# Patient Record
Sex: MALE | Race: WHITE | Employment: FULL TIME | URBAN - METROPOLITAN AREA
[De-identification: names, ages, dates, MRNs, and addresses within clinical notes are randomized per-mention and may not be internally consistent; named-entity substitution may affect disease eponyms.]

---

## 2019-04-12 NOTE — PROGRESS NOTES
[facility] / [facility] ([facility]) Génesis Esquivel, [address]    Acknowledgment of Informed Consent for Surgical or Medical Procedure and Sedation  I agree to allow doctor(s) Hari Banegas and his/her associates or assistants, including residents and/or other qualified medical practitioner to perform the following medical treatment or procedure and to administer or direct the administration of sedation as necessary:  Procedure(s): RIGHT EYE INFERIOR OBLIQUE WEAKENING BY RECESSION  My doctor has explained the following regarding the proposed procedure:   the explanation of the procedure   the benefits of the procedure   the potential problems that might occur during recuperation   the risks and side effects of the procedure which could include but are not limited to severe blood loss, infection, stroke or death   the benefits, risks and side effect of alternative procedures including the consequences of declining this procedure or any alternative procedures   the likelihood of achieving satisfactory results. I acknowledge no guarantee or assurance has been made to me regarding the results. I understand that during the course of this treatment/procedure, unforeseen conditions can occur which require an additional or different procedure. I agree to allow my physician or assistants to perform such extension of the original procedure as they may find necessary. I understand that sedation will often result in temporary impairment of memory and fine motor skills and that sedation can occasionally progress to a state of deep sedation or general anesthesia. I understand the risks of anesthesia for surgery include, but are not limited to, sore throat, hoarseness, injury to face, mouth, or teeth; nausea; headache; injury to blood vessels or nerves; death, brain damage, or paralysis.     I understand that if I have a Limitation of Treatment order in effect during my hospitalization, the order may or may not be in effect during this procedure. I give my doctor permission to give me blood or blood products. I understand that there are risks with receiving blood such as hepatitis, AIDS, fever, or allergic reaction. I acknowledge that the risks, benefits, and alternatives of this treatment have been explained to me and that no express or implied warranty has been given by the hospital, any blood bank, or any person or entity as to the blood or blood components transfused. At the discretion of my doctor, I agree to allow observers, equipment/product representatives and allow photographing, and/or televising of the procedure, provided my name or identity is maintained confidentially. I agree the hospital may dispose of or use for scientific or educational purposes any tissue, fluid, or body parts which may be removed.     ________________________________Date________Time______ am/pm  ([place] [facility])  Patient or Signature of Closest Relative or Legal Guardian    ________________________________Date________Time______am/pm      Page 1 of  1  Witness

## 2019-04-18 NOTE — PROGRESS NOTES
PRE-OP INSTRUCTIONS FOR THE SURGICAL PATIENT YOU ARE UNABLE TO MAKE CONTACT FOR AN INTERVIEW:      1. Follow instructions for your ARRIVAL TIME as DIRECTED BY YOUR SURGEON. 2. Enter the MAIN entrance located on [address] and report to the desk. 3. Bring your insurance & prescription card and photo ID with you. You may also be asked to pay a co-pay, as you may want to bring a check or credit card with you. 4. Leave all other valuables at home. 5. Arrange for someone to drive you home and be with you for the first 24 hours after discharge. 6. You must contact your surgeon for ALL medication instructions, especially if taking blood thinners, aspirin, or diabetic medication. 7. A Pre-op History and Physical for surgery MUST be completed by your Physician or an Urgent Care within 30 days of your procedure date. Please bring a copy with you on the day of your procedure and along with any other testing performed. 8. DO NOT EAT ANYTHING eight hours prior to surgery. May have up to 8 ounces of water 4 hours prior to surgery. 9. No gum, candy, mints, or ice chips day of procedure. 10. Please refrain from drinking alcohol the day before or day of your procedure. 11. Please do not smoke the day of your procedure. 12. Dress in loose, comfortable clothing appropriate for redressing after your procedure. Do not wear jewelry (including body piercings), make-up, fingernail polish, lotion, powders or metal hairclips. 15. Contacts will need to be removed prior to surgery. You may want to bring your            Eye glasses to wear immediately before and after surgery. 14. Dentures will need to be removed before your procedure. 13. Bring cases for your glasses, contacts, dentures, or hearing aids to protect them while you are in surgery. 16. If you use a CPAP, please bring it with you on the day of your procedure.   17. Do not shave or wax for 72 hours prior to procedure near your operative site  18. FOR WOMAN OF CHILDBEARING AGE ONLY- please bring a urine sample with you on day of surgery or make sure we can collect on arrival.    If you have further questions, you may contact us at [phone]    Left instructions on patient's voicemail. Haroldo Nair. 4/18/2019 .2:02 PM

## 2019-04-22 ENCOUNTER — ANESTHESIA EVENT (OUTPATIENT)
Dept: OPERATING ROOM | Age: 49
End: 2019-04-22
Payer: COMMERCIAL

## 2019-04-23 ENCOUNTER — ANESTHESIA (OUTPATIENT)
Dept: OPERATING ROOM | Age: 49
End: 2019-04-23
Payer: COMMERCIAL

## 2019-04-23 ENCOUNTER — HOSPITAL ENCOUNTER (OUTPATIENT)
Age: 49
Setting detail: OUTPATIENT SURGERY
Discharge: HOME | End: 2019-04-23
Attending: OPHTHALMOLOGY | Admitting: OPHTHALMOLOGY
Payer: COMMERCIAL

## 2019-04-23 VITALS — SYSTOLIC BLOOD PRESSURE: 169 MMHG | DIASTOLIC BLOOD PRESSURE: 99 MMHG | OXYGEN SATURATION: 98 % | TEMPERATURE: 97.2 F

## 2019-04-23 VITALS
HEART RATE: 77 BPM | OXYGEN SATURATION: 94 % | WEIGHT: 315 LBS | SYSTOLIC BLOOD PRESSURE: 99 MMHG | BODY MASS INDEX: 46.65 KG/M2 | HEIGHT: 69 IN | TEMPERATURE: 97.3 F | DIASTOLIC BLOOD PRESSURE: 64 MMHG | RESPIRATION RATE: 16 BRPM

## 2019-04-23 LAB
GLUCOSE BLD-MCNC: 144 MG/DL (ref 70–99)
GLUCOSE BLD-MCNC: 168 MG/DL (ref 70–99)
PERFORMED ON: ABNORMAL
PERFORMED ON: ABNORMAL

## 2019-04-23 PROCEDURE — 7100000010 HC PHASE II RECOVERY - FIRST 15 MIN: Performed by: OPHTHALMOLOGY

## 2019-04-23 PROCEDURE — 3700000001 HC ADD 15 MINUTES (ANESTHESIA): Performed by: OPHTHALMOLOGY

## 2019-04-23 PROCEDURE — 2580000003 HC RX 258: Performed by: OPHTHALMOLOGY

## 2019-04-23 PROCEDURE — 7100000000 HC PACU RECOVERY - FIRST 15 MIN: Performed by: OPHTHALMOLOGY

## 2019-04-23 PROCEDURE — 2709999900 HC NON-CHARGEABLE SUPPLY: Performed by: OPHTHALMOLOGY

## 2019-04-23 PROCEDURE — 3600000003 HC SURGERY LEVEL 3 BASE: Performed by: OPHTHALMOLOGY

## 2019-04-23 PROCEDURE — 6360000002 HC RX W HCPCS: Performed by: NURSE ANESTHETIST, CERTIFIED REGISTERED

## 2019-04-23 PROCEDURE — 2580000003 HC RX 258: Performed by: ANESTHESIOLOGY

## 2019-04-23 PROCEDURE — 7100000011 HC PHASE II RECOVERY - ADDTL 15 MIN: Performed by: OPHTHALMOLOGY

## 2019-04-23 PROCEDURE — 7100000001 HC PACU RECOVERY - ADDTL 15 MIN: Performed by: OPHTHALMOLOGY

## 2019-04-23 PROCEDURE — 6360000002 HC RX W HCPCS: Performed by: ANESTHESIOLOGY

## 2019-04-23 PROCEDURE — 2500000003 HC RX 250 WO HCPCS: Performed by: NURSE ANESTHETIST, CERTIFIED REGISTERED

## 2019-04-23 PROCEDURE — 3600000013 HC SURGERY LEVEL 3 ADDTL 15MIN: Performed by: OPHTHALMOLOGY

## 2019-04-23 PROCEDURE — 3700000000 HC ANESTHESIA ATTENDED CARE: Performed by: OPHTHALMOLOGY

## 2019-04-23 PROCEDURE — 6370000000 HC RX 637 (ALT 250 FOR IP): Performed by: OPHTHALMOLOGY

## 2019-04-23 RX ORDER — ONDANSETRON 2 MG/ML
INJECTION INTRAMUSCULAR; INTRAVENOUS PRN
Status: DISCONTINUED | OUTPATIENT
Start: 2019-04-23 | End: 2019-04-23 | Stop reason: SDUPTHER

## 2019-04-23 RX ORDER — TETRACAINE HYDROCHLORIDE 5 MG/ML
SOLUTION OPHTHALMIC PRN
Status: DISCONTINUED | OUTPATIENT
Start: 2019-04-23 | End: 2019-04-23 | Stop reason: ALTCHOICE

## 2019-04-23 RX ORDER — TOBRAMYCIN AND DEXAMETHASONE 3; 1 MG/ML; MG/ML
SUSPENSION/ DROPS OPHTHALMIC PRN
Status: DISCONTINUED | OUTPATIENT
Start: 2019-04-23 | End: 2019-04-23 | Stop reason: ALTCHOICE

## 2019-04-23 RX ORDER — ROCURONIUM BROMIDE 10 MG/ML
INJECTION, SOLUTION INTRAVENOUS PRN
Status: DISCONTINUED | OUTPATIENT
Start: 2019-04-23 | End: 2019-04-23 | Stop reason: SDUPTHER

## 2019-04-23 RX ORDER — SUCCINYLCHOLINE/SOD CL,ISO/PF 200MG/10ML
SYRINGE (ML) INTRAVENOUS PRN
Status: DISCONTINUED | OUTPATIENT
Start: 2019-04-23 | End: 2019-04-23 | Stop reason: SDUPTHER

## 2019-04-23 RX ORDER — PROPOFOL 10 MG/ML
INJECTION, EMULSION INTRAVENOUS PRN
Status: DISCONTINUED | OUTPATIENT
Start: 2019-04-23 | End: 2019-04-23 | Stop reason: SDUPTHER

## 2019-04-23 RX ORDER — PHENYLEPHRINE HCL 2.5 %
DROPS OPHTHALMIC (EYE) PRN
Status: DISCONTINUED | OUTPATIENT
Start: 2019-04-23 | End: 2019-04-23 | Stop reason: ALTCHOICE

## 2019-04-23 RX ORDER — PROMETHAZINE HYDROCHLORIDE 25 MG/ML
6.25 INJECTION, SOLUTION INTRAMUSCULAR; INTRAVENOUS
Status: DISCONTINUED | OUTPATIENT
Start: 2019-04-23 | End: 2019-04-23 | Stop reason: HOSPADM

## 2019-04-23 RX ORDER — FENTANYL CITRATE 50 UG/ML
50 INJECTION, SOLUTION INTRAMUSCULAR; INTRAVENOUS EVERY 5 MIN PRN
Status: DISCONTINUED | OUTPATIENT
Start: 2019-04-23 | End: 2019-04-23 | Stop reason: HOSPADM

## 2019-04-23 RX ORDER — LIDOCAINE HYDROCHLORIDE 20 MG/ML
INJECTION, SOLUTION INTRAVENOUS PRN
Status: DISCONTINUED | OUTPATIENT
Start: 2019-04-23 | End: 2019-04-23 | Stop reason: SDUPTHER

## 2019-04-23 RX ORDER — FENTANYL CITRATE 50 UG/ML
INJECTION, SOLUTION INTRAMUSCULAR; INTRAVENOUS PRN
Status: DISCONTINUED | OUTPATIENT
Start: 2019-04-23 | End: 2019-04-23 | Stop reason: SDUPTHER

## 2019-04-23 RX ORDER — SODIUM CHLORIDE, SODIUM LACTATE, POTASSIUM CHLORIDE, CALCIUM CHLORIDE 600; 310; 30; 20 MG/100ML; MG/100ML; MG/100ML; MG/100ML
INJECTION, SOLUTION INTRAVENOUS CONTINUOUS
Status: DISCONTINUED | OUTPATIENT
Start: 2019-04-23 | End: 2019-04-23 | Stop reason: HOSPADM

## 2019-04-23 RX ORDER — MAGNESIUM HYDROXIDE 1200 MG/15ML
LIQUID ORAL PRN
Status: DISCONTINUED | OUTPATIENT
Start: 2019-04-23 | End: 2019-04-23 | Stop reason: ALTCHOICE

## 2019-04-23 RX ORDER — BALANCED SALT SOLUTION 6.4; .75; .48; .3; 3.9; 1.7 MG/ML; MG/ML; MG/ML; MG/ML; MG/ML; MG/ML
SOLUTION OPHTHALMIC PRN
Status: DISCONTINUED | OUTPATIENT
Start: 2019-04-23 | End: 2019-04-23 | Stop reason: ALTCHOICE

## 2019-04-23 RX ORDER — HYDROCHLOROTHIAZIDE 25 MG/1
25 TABLET ORAL DAILY
COMMUNITY

## 2019-04-23 RX ORDER — ONDANSETRON 2 MG/ML
4 INJECTION INTRAMUSCULAR; INTRAVENOUS
Status: DISCONTINUED | OUTPATIENT
Start: 2019-04-23 | End: 2019-04-23 | Stop reason: HOSPADM

## 2019-04-23 RX ORDER — DEXAMETHASONE SODIUM PHOSPHATE 4 MG/ML
INJECTION, SOLUTION INTRA-ARTICULAR; INTRALESIONAL; INTRAMUSCULAR; INTRAVENOUS; SOFT TISSUE PRN
Status: DISCONTINUED | OUTPATIENT
Start: 2019-04-23 | End: 2019-04-23 | Stop reason: SDUPTHER

## 2019-04-23 RX ORDER — FENTANYL CITRATE 50 UG/ML
25 INJECTION, SOLUTION INTRAMUSCULAR; INTRAVENOUS EVERY 5 MIN PRN
Status: DISCONTINUED | OUTPATIENT
Start: 2019-04-23 | End: 2019-04-23 | Stop reason: HOSPADM

## 2019-04-23 RX ORDER — GLYCOPYRROLATE 1 MG/5 ML
SYRINGE (ML) INTRAVENOUS PRN
Status: DISCONTINUED | OUTPATIENT
Start: 2019-04-23 | End: 2019-04-23 | Stop reason: SDUPTHER

## 2019-04-23 RX ORDER — BENAZEPRIL HYDROCHLORIDE 20 MG/1
20 TABLET ORAL DAILY
COMMUNITY

## 2019-04-23 RX ORDER — LEVOTHYROXINE SODIUM 0.07 MG/1
75 TABLET ORAL DAILY
COMMUNITY

## 2019-04-23 RX ORDER — MIDAZOLAM HYDROCHLORIDE 1 MG/ML
INJECTION INTRAMUSCULAR; INTRAVENOUS PRN
Status: DISCONTINUED | OUTPATIENT
Start: 2019-04-23 | End: 2019-04-23 | Stop reason: SDUPTHER

## 2019-04-23 RX ORDER — AMLODIPINE BESYLATE 5 MG/1
5 TABLET ORAL DAILY
COMMUNITY

## 2019-04-23 RX ORDER — PRAVASTATIN SODIUM 40 MG
40 TABLET ORAL DAILY
COMMUNITY

## 2019-04-23 RX ADMIN — MIDAZOLAM HYDROCHLORIDE 1 MG: 2 INJECTION, SOLUTION INTRAMUSCULAR; INTRAVENOUS at 09:06

## 2019-04-23 RX ADMIN — FENTANYL CITRATE 50 MCG: 50 INJECTION INTRAMUSCULAR; INTRAVENOUS at 10:42

## 2019-04-23 RX ADMIN — FENTANYL CITRATE 50 MCG: 50 INJECTION INTRAMUSCULAR; INTRAVENOUS at 09:20

## 2019-04-23 RX ADMIN — PROPOFOL 250 MG: 10 INJECTION, EMULSION INTRAVENOUS at 09:20

## 2019-04-23 RX ADMIN — SUGAMMADEX 200 MG: 100 INJECTION, SOLUTION INTRAVENOUS at 09:50

## 2019-04-23 RX ADMIN — Medication 0.2 MG: at 09:06

## 2019-04-23 RX ADMIN — LIDOCAINE HYDROCHLORIDE 100 MG: 20 INJECTION, SOLUTION INTRAVENOUS at 09:20

## 2019-04-23 RX ADMIN — SODIUM CHLORIDE, POTASSIUM CHLORIDE, SODIUM LACTATE AND CALCIUM CHLORIDE: 600; 310; 30; 20 INJECTION, SOLUTION INTRAVENOUS at 08:00

## 2019-04-23 RX ADMIN — ROCURONIUM BROMIDE 5 MG: 10 INJECTION, SOLUTION INTRAVENOUS at 09:20

## 2019-04-23 RX ADMIN — ONDANSETRON 4 MG: 2 INJECTION INTRAMUSCULAR; INTRAVENOUS at 09:36

## 2019-04-23 RX ADMIN — ROCURONIUM BROMIDE 20 MG: 10 INJECTION, SOLUTION INTRAVENOUS at 09:40

## 2019-04-23 RX ADMIN — DEXAMETHASONE SODIUM PHOSPHATE 8 MG: 4 INJECTION, SOLUTION INTRAMUSCULAR; INTRAVENOUS at 09:36

## 2019-04-23 RX ADMIN — FENTANYL CITRATE 25 MCG: 50 INJECTION INTRAMUSCULAR; INTRAVENOUS at 10:59

## 2019-04-23 RX ADMIN — Medication 140 MG: at 09:20

## 2019-04-23 RX ADMIN — ROCURONIUM BROMIDE 25 MG: 10 INJECTION, SOLUTION INTRAVENOUS at 09:22

## 2019-04-23 ASSESSMENT — PULMONARY FUNCTION TESTS
PIF_VALUE: 26
PIF_VALUE: 26
PIF_VALUE: 2
PIF_VALUE: 43
PIF_VALUE: 0
PIF_VALUE: 27
PIF_VALUE: 27
PIF_VALUE: 1
PIF_VALUE: 21
PIF_VALUE: 26
PIF_VALUE: 26
PIF_VALUE: 27
PIF_VALUE: 12
PIF_VALUE: 26
PIF_VALUE: 27
PIF_VALUE: 1
PIF_VALUE: 26
PIF_VALUE: 21
PIF_VALUE: 26
PIF_VALUE: 27
PIF_VALUE: 11
PIF_VALUE: 2
PIF_VALUE: 19
PIF_VALUE: 1
PIF_VALUE: 27
PIF_VALUE: 31
PIF_VALUE: 27
PIF_VALUE: 0
PIF_VALUE: 1
PIF_VALUE: 27
PIF_VALUE: 26
PIF_VALUE: 1
PIF_VALUE: 1
PIF_VALUE: 26
PIF_VALUE: 27
PIF_VALUE: 20
PIF_VALUE: 17
PIF_VALUE: 26
PIF_VALUE: 1
PIF_VALUE: 26
PIF_VALUE: 27

## 2019-04-23 ASSESSMENT — PAIN SCALES - GENERAL
PAINLEVEL_OUTOF10: 8
PAINLEVEL_OUTOF10: 6
PAINLEVEL_OUTOF10: 4
PAINLEVEL_OUTOF10: 0

## 2019-04-23 ASSESSMENT — PAIN DESCRIPTION - PROGRESSION: CLINICAL_PROGRESSION: GRADUALLY IMPROVING

## 2019-04-23 ASSESSMENT — PAIN - FUNCTIONAL ASSESSMENT: PAIN_FUNCTIONAL_ASSESSMENT: 0-10

## 2019-04-23 ASSESSMENT — LIFESTYLE VARIABLES: SMOKING_STATUS: 0

## 2019-04-23 ASSESSMENT — PAIN DESCRIPTION - LOCATION: LOCATION: EYE

## 2019-04-23 ASSESSMENT — PAIN DESCRIPTION - FREQUENCY: FREQUENCY: CONTINUOUS

## 2019-04-23 ASSESSMENT — PAIN DESCRIPTION - PAIN TYPE: TYPE: SURGICAL PAIN

## 2019-04-23 ASSESSMENT — PAIN DESCRIPTION - DESCRIPTORS: DESCRIPTORS: DULL;ACHING

## 2019-04-23 ASSESSMENT — PAIN DESCRIPTION - ORIENTATION: ORIENTATION: RIGHT

## 2019-04-23 NOTE — OP NOTE
[address], [address]                                OPERATIVE REPORT    PATIENT NAME: Shalonda Leary                  :        1970  MED REC NO:   [id number]                          ROOM:  ACCOUNT NO:   [de-identified]                           ADMIT DATE: 2019  PROVIDER:     Larue Osler, MD    DATE OF PROCEDURE:  2019    PREOPERATIVE DIAGNOSIS:  Right hypertropia. POSTOPERATIVE DIAGNOSIS:  Right hypertropia. PROCEDURE:  Right inferior oblique disinsertion. SURGEON:  Larue Osler, M.D. ANESTHESIA:  General using intubation. INDICATIONS FOR PROCEDURE:  The patient is a man, who presented to me  earlier this year with at least a five-plus-year history of double  vision, worse in left gaze, having difficulty with driving and reading,  often finding himself closing his left eye. He also remembers having a  picture in  at his wedding where his left upper lid was down. He  sometimes is aware that he has a left head tilt. He had an MRI done in  2018 because of his double vision by his primary care doctor and it  was normal.  He denies any motor vehicle accidents or any head trauma. He had uncorrected vision of 20/20 in his right eye and 20/25 in his  left. He had a 25 to 30 presumed diopter left hypotropia (right  hypertropia). When I went back and rechecked it later on his first  exam, I could only get 18. The measurements were incomitant. It was  worse in left gaze, less in right gaze, less in downgaze, and about the  same in upgaze. It was much worse on right head tilt and left head  tilt. He had excellent stereopsis, although with [place] 4-dot. He was binocular with both eyes, not using. He did have a small left  head tilt and a small face turn position.   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 I discussed this with him. I told  him I needed more measurements. I did do a 30-minute patch test to make  sure I was able to get his maximum deviation. So, he returned on  02/20/2019. I did the 30-minute patch test.  The maximum I could get  was between 20 and 25 prism diopters of right hypertropia. Again, it  was incomitant as before. His extraocular movements suggested an  overreaction of the right inferior oblique and under-reaction of the  right superior oblique, but in far downgaze, he actually had a slight  overreaction of the left superior oblique, although his clinical  examination did not appear if this was a bilateral superior oblique  palsy. So today's procedure is an attempt to eliminate his right  hypertropia. I told him that his measurements being variable at 18,  definitely would only be doing his right inferior oblique at 30. I will  be doing his right inferior oblique and his left inferior rectus, but  because he is so much better in downgaze and he has excellent fusion, I  told him that I would much rather undercorrect him a little bit and see  if he had anymore symptoms, then overcorrect him and give him a left  hypertropia. So he understood why I was cautious. So, today's  procedure is an attempt to relax the right inferior oblique and  hopefully balance it with the right superior oblique palsy. DESCRIPTION OF PROCEDURE:  The patient was brought to the operating room  where general anesthesia was intubated. The patient was prepped and  draped in the usual sterile manner under anesthesia. There was just a  very small amount of probably slight exotropia if anything, just a tiny  right hyper, but essentially looking pretty straight. The patient was  prepped and draped in the usual sterile manner, and attention directed  to the right eye where speculum was placed to obtain good exposure. I  retracted the eye into the superior nasal quadrant. I did an inferior  temporal cul-de-sac incision. I opened the Tenon's. I secured the  inferior rectus muscle, then the lateral rectus muscle, and used these  muscles as retractors to pull the eye superiorly and superonasally. I  then used the [facility] retractor to visualize the inferior temporal  cul-de-sac where the inferior oblique was visualized. I secured it in a  series of muscle hooks. I did my triple hooking maneuver to make sure I  had it all and I did. I then disinserted the muscle. I encouraged it  to pull into the Tenon's capsule area. I then closed the cul-de-sac incision with two interrupted 6-0 plain  sutures. I put tetracaine eyedrops in his eye. I put Betadine eyedrops  in his eye. I put TobraDex ophthalmic drops in his eye, and then I put  TobraDex ophthalmic ointment in his eye, and then I pressure patched it. The patient was allowed to recover from anesthesia. He was obese and he  had his CPAP machine, which allowed him to recover much better than it  had been before. The patient was taken to the recovery room in a good  condition. DISPOSITION:  I spoke to his wife postoperatively, reiterating the  surgical event and anticipated postop outcome. I reviewed with her his  reason that he was here and what procedure I did today and what possible  procedure I might need to do in the future. I reviewed with her my  four-page postoperative handout, which includes all my instructions for  postoperative care including phone numbers to reach me if there are any  problems. I gave her the TobraDex ophthalmic drops and TobraDex  ophthalmic ointment to use it a total of four times a day into the right  eye for one week. I did not give him any prescriptions, just gave him  medications. I have asked her to have him call me in a couple of days  to let me how he is doing postoperatively.   Assuming things are going  well, he has a tentatively scheduled postoperative visit with me on  05/14/2019.         Melquiades White MD    D: 04/23/2019 12:29:00       T: 04/23/2019 14:00:30     [email or web address]  Job#: [id number]     Doc#: [id number]    CC:  Hal Brush MD

## 2019-04-23 NOTE — FLOWSHEET NOTE
04/23/19 0920   Encounter Summary   Services provided to: Patient and family together   Referral/Consult From: [address] [facility] Family members   Continue Visiting   (4/23, joey )   Complexity of Encounter Moderate   Length of Encounter 15 minutes   Routine   Type Pre-procedure   Assessment Calm; Approachable; Hopeful   Intervention Active listening;Explored feelings, thoughts, concerns;Nurtured hope   Outcome Comfort;Expressed gratitude

## 2019-04-23 NOTE — ANESTHESIA POSTPROCEDURE EVALUATION
Department of Anesthesiology  Postprocedure Note    Patient: Ford Shipman  MRN: [id number]  YOB: 1970  Date of evaluation: 4/23/2019  Time:  12:55 PM     Procedure Summary     Date:  04/23/19 Room / Location:  [facility] OR 13 / [facility] OR    Anesthesia Start:  0915 Anesthesia Stop:  1011    Procedure:  RIGHT EYE INFERIOR OBLIQUE WEAKENING BY RECESSION (Right ) Diagnosis:  (VERTICAL, STRABISMUS RIGHT EYE)    Surgeon:  Madie Benitez MD Responsible Provider:  Marcus Ga MD    Anesthesia Type:  general ASA Status:  3          Anesthesia Type: general    Genaro Phase I: Genaro Score: 10    Genaro Phase II: Genaro Score: 10    Last vitals: Reviewed and per EMR flowsheets.        Anesthesia Post Evaluation    Patient location during evaluation: PACU  Patient participation: complete - patient participated  Level of consciousness: awake and alert  Airway patency: patent  Nausea & Vomiting: no nausea and no vomiting  Complications: no  Cardiovascular status: blood pressure returned to baseline  Respiratory status: acceptable  Hydration status: stable

## 2019-04-23 NOTE — PROGRESS NOTES
Patient admitted to PACU # 5 from OR at 1005 post right eye surgery per Dr. Barbara Hayes. Attached to PACU monitoring system and report received from CRNA. Patient was hemodynamically stable during surgical procedure. Arrived in PACU wakeful and with no initial c/o pain.

## 2019-04-23 NOTE — PROGRESS NOTES
Ambulatory Surgery/Procedure Discharge Note    Vitals:    04/23/19 1147   BP: 99/64   Pulse: 77   Resp: 16   Temp: 97.3 °F (36.3 °C)   SpO2: 94%       In: 805 [I.V.:805]  Out: 0     Restroom use offered before discharge. Yes    Pain assessment:  level of pain (1-10, 10 severe), eye patch changed DR Ceasar Del Rio here  Pain Level: 4        Patient discharged to home/self care.  Patient discharged via wheel chair by transporter to waiting family/S.O.       4/23/2019 12:02 PM

## 2019-04-23 NOTE — H&P
Jemima Ballard    [id number]    [facility] Same Day Surgery Update H & P  Department of General Surgery   Surgical Service   Pre-operative History and Physical  Last H & P within the last 30 days. DIAGNOSIS:   VERTICAL, STRABISMUS RIGHT EYE    PROCEDURE:  IL OFFICE/OUTPT VISIT,PROCEDURE ONLY [36731] (EYE MUSCLE REPAIR)     HISTORY OF PRESENT ILLNESS:   Pleasant, morbidly obese (BMI 5299), 50year old male with vertical strabismus of the right eye presents today for the above procedure.      Past Medical History:        Diagnosis Date    Diabetes mellitus ([address]     TYPE 2    Hyperlipidemia     Hypertension     KAYLAN (obstructive sleep apnea)      Past Surgical History:        Procedure Laterality Date    CHOLECYSTECTOMY       Past Social History:  Social History     Socioeconomic History    Marital status:      Spouse name: Not on file    Number of children: Not on file    Years of education: Not on file    Highest education level: Not on file   Occupational History    Not on file   Social Needs    Financial resource strain: Not on file    Food insecurity:     Worry: Not on file     Inability: Not on file    Transportation needs:     Medical: Not on file     Non-medical: Not on file   Tobacco Use    Smoking status: Not on file   Substance and Sexual Activity    Alcohol use: Not on file    Drug use: Not on file    Sexual activity: Not on file   Lifestyle    Physical activity:     Days per week: Not on file     Minutes per session: Not on file    Stress: Not on file   Relationships    Social connections:     Talks on phone: Not on file     Gets together: Not on file     Attends [employer] service: Not on file     Active member of club or organization: Not on file     Attends meetings of clubs or organizations: Not on file     Relationship status: Not on file    Intimate partner violence:     Fear of current or ex partner: Not on file     Emotionally abused: Not on file     Physically abused: Not on file     Forced sexual activity: Not on file   Other Topics Concern    Not on file   Social History Narrative    Not on file         Medications Prior to Admission:       benazepril (LOTENSIN) 20 MG TABS TAKE 1 TABLET BY MOUTH DAILY    INVOKANA 300 MG TABS TAKE ONE TABLET BY MOUTH EVERY MORNING    metFORMIN (GLUCOPHAGE) 500 MG TABS TAKE TWO TABLETS BY MOUTH TWO TIMES A DAY    levothyroxine (SYNTHROID, LEVOTHROID) 75 MCG TABS TAKE 1 TABLET BY MOUTH DAILY.  hydrochlorothiazide (HYDRODIURIL) 25 MG TABS TAKE 1 TABLET BY MOUTH EVERY MORNING    amlodipine (NORVASC) 5 MG TABS TAKE ONE TABLET BY MOUTH EVERY DAY    pravastatin (PRAVACHOL) 40 MG TABS TAKE ONE TABLET BY MOUTH EVERY DAY       Allergies: Bee Stings [Bee Sting] Hives       PHYSICAL EXAM:      BP (!) 140/89   Pulse 96   Temp 97.4 °F (36.3 °C) (Oral)   Resp 16   Ht 5' 9\" (1.753 m)   Wt (!) 325 lb (147.4 kg)   SpO2 93%   BMI 47.99 kg/m²      Heart:  regular rate and rhythm    Lungs:  No increased work of breathing, good air exchange, clear to auscultation bilaterally, no crackles or wheezing    Abdomen:  soft, non-distended, non-tender, no rebound tenderness or guarding, normal active bowel sounds and no masses palpated    ASSESSMENT AND PLAN:    1. Patient seen and focused exam done today- no new changes since last physical exam on 4/11/19    2. Access to ancillary services are available per request of the provider.     RHEA Villalobos - CNP     4/23/2019

## 2019-04-23 NOTE — ANESTHESIA PRE PROCEDURE
Department of Anesthesiology  Preprocedure Note       Name:  Juliane Merritt   Age:  50 y.o.  :  1970                                          MRN:  [id number]         Date:  2019      Surgeon: Derik Delaney):  Mahnaz Yip MD    Procedure: RIGHT EYE INFERIOR OBLIQUE WEAKENING BY RECESSION (Right )    Medications prior to admission:   Prior to Admission medications    Not on File       Current medications:    No current facility-administered medications for this encounter. No current outpatient medications on file. Allergies: Allergies not on file    Problem List:  There is no problem list on file for this patient. Past Medical History:        Diagnosis Date    Diabetes mellitus [address] [address]     TYPE 2    Hyperlipidemia     Hypertension     KAYLAN (obstructive sleep apnea)        Past Surgical History:        Procedure Laterality Date    CHOLECYSTECTOMY         Social History:    Social History     Tobacco Use    Smoking status: Not on file   Substance Use Topics    Alcohol use: Not on file                                Counseling given: Not Answered      Vital Signs (Current): There were no vitals filed for this visit. BP Readings from Last 3 Encounters:   No data found for BP       NPO Status:                                                                                 BMI:   Wt Readings from Last 3 Encounters:   No data found for Wt     There is no height or weight on file to calculate BMI.    CBC: No results found for: WBC, RBC, HGB, HCT, MCV, RDW, PLT    CMP: No results found for: NA, K, CL, CO2, BUN, CREATININE, GFRAA, AGRATIO, LABGLOM, GLUCOSE, PROT, CALCIUM, BILITOT, ALKPHOS, AST, ALT    POC Tests: No results for input(s): POCGLU, POCNA, POCK, POCCL, POCBUN, POCHEMO, POCHCT in the last 72 hours.     Coags: No results found for: PROTIME, INR, APTT    HCG (If Applicable): No results found for: PREGTESTUR, PREGSERUM, HCG, HCGQUANT     ABGs:

## 2019-04-23 NOTE — BRIEF OP NOTE
Brief Postoperative Note  ______________________________________________________________    Patient: Shadi Charles  YOB: 1970  MRN: [id number]  Date of Procedure: 4/23/2019    Pre-Op Diagnosis: VERTICAL, STRABISMUS RIGHT EYE    Post-Op Diagnosis: Same       Procedure(s):  RIGHT EYE INFERIOR OBLIQUE WEAKENING BY DISINSERTION    Anesthesia: General    Surgeon(s):  Adriana Rinaldi MD    Assistant: MALE PERSON    Estimated Blood Loss (mL): < 1ML    Complications: none    Specimens:   none    Implants:  none      Drains: none    Findings: fairly ST [facility]    Prema Heredia MD  Date: 4/23/2019  Time: 10:23 AM

## 2022-08-04 ENCOUNTER — HOSPITAL ENCOUNTER (OUTPATIENT)
Age: 52
Setting detail: OUTPATIENT SURGERY
Discharge: HOME | End: 2022-08-04
Attending: INTERNAL MEDICINE | Admitting: INTERNAL MEDICINE
Payer: COMMERCIAL

## 2022-08-04 ENCOUNTER — ANESTHESIA (OUTPATIENT)
Dept: ENDOSCOPY | Age: 52
End: 2022-08-04
Payer: COMMERCIAL

## 2022-08-04 ENCOUNTER — ANESTHESIA EVENT (OUTPATIENT)
Dept: ENDOSCOPY | Age: 52
End: 2022-08-04
Payer: COMMERCIAL

## 2022-08-04 VITALS
DIASTOLIC BLOOD PRESSURE: 69 MMHG | WEIGHT: 315 LBS | TEMPERATURE: 97 F | HEART RATE: 95 BPM | RESPIRATION RATE: 16 BRPM | SYSTOLIC BLOOD PRESSURE: 114 MMHG | BODY MASS INDEX: 45.1 KG/M2 | HEIGHT: 70 IN | OXYGEN SATURATION: 93 %

## 2022-08-04 DIAGNOSIS — Z12.11 SCREEN FOR COLON CANCER: ICD-10-CM

## 2022-08-04 LAB
GLUCOSE BLD-MCNC: 109 MG/DL (ref 70–99)
PERFORMED ON: ABNORMAL

## 2022-08-04 PROCEDURE — 3700000000 HC ANESTHESIA ATTENDED CARE: Performed by: INTERNAL MEDICINE

## 2022-08-04 PROCEDURE — 7100000011 HC PHASE II RECOVERY - ADDTL 15 MIN: Performed by: INTERNAL MEDICINE

## 2022-08-04 PROCEDURE — 2709999900 HC NON-CHARGEABLE SUPPLY: Performed by: INTERNAL MEDICINE

## 2022-08-04 PROCEDURE — 2500000003 HC RX 250 WO HCPCS

## 2022-08-04 PROCEDURE — 6360000002 HC RX W HCPCS

## 2022-08-04 PROCEDURE — 3609010600 HC COLONOSCOPY POLYPECTOMY SNARE/COLD BIOPSY: Performed by: INTERNAL MEDICINE

## 2022-08-04 PROCEDURE — 7100000010 HC PHASE II RECOVERY - FIRST 15 MIN: Performed by: INTERNAL MEDICINE

## 2022-08-04 PROCEDURE — 2580000003 HC RX 258: Performed by: ANESTHESIOLOGY

## 2022-08-04 PROCEDURE — 3700000001 HC ADD 15 MINUTES (ANESTHESIA): Performed by: INTERNAL MEDICINE

## 2022-08-04 PROCEDURE — 88305 TISSUE EXAM BY PATHOLOGIST: CPT

## 2022-08-04 RX ORDER — SODIUM CHLORIDE 0.9 % (FLUSH) 0.9 %
5-40 SYRINGE (ML) INJECTION EVERY 12 HOURS SCHEDULED
Status: DISCONTINUED | OUTPATIENT
Start: 2022-08-04 | End: 2022-08-04 | Stop reason: HOSPADM

## 2022-08-04 RX ORDER — SODIUM CHLORIDE 0.9 % (FLUSH) 0.9 %
5-40 SYRINGE (ML) INJECTION PRN
Status: DISCONTINUED | OUTPATIENT
Start: 2022-08-04 | End: 2022-08-04 | Stop reason: HOSPADM

## 2022-08-04 RX ORDER — LIDOCAINE HYDROCHLORIDE 20 MG/ML
INJECTION, SOLUTION EPIDURAL; INFILTRATION; INTRACAUDAL; PERINEURAL PRN
Status: DISCONTINUED | OUTPATIENT
Start: 2022-08-04 | End: 2022-08-04 | Stop reason: SDUPTHER

## 2022-08-04 RX ORDER — PROPOFOL 10 MG/ML
INJECTION, EMULSION INTRAVENOUS PRN
Status: DISCONTINUED | OUTPATIENT
Start: 2022-08-04 | End: 2022-08-04 | Stop reason: SDUPTHER

## 2022-08-04 RX ORDER — SODIUM CHLORIDE 9 MG/ML
INJECTION, SOLUTION INTRAVENOUS PRN
Status: DISCONTINUED | OUTPATIENT
Start: 2022-08-04 | End: 2022-08-04 | Stop reason: HOSPADM

## 2022-08-04 RX ORDER — DULAGLUTIDE 0.75 MG/.5ML
0.75 INJECTION, SOLUTION SUBCUTANEOUS WEEKLY
COMMUNITY

## 2022-08-04 RX ADMIN — PROPOFOL 50 MG: 10 INJECTION, EMULSION INTRAVENOUS at 14:47

## 2022-08-04 RX ADMIN — PROPOFOL 30 MG: 10 INJECTION, EMULSION INTRAVENOUS at 14:52

## 2022-08-04 RX ADMIN — PROPOFOL 100 MG: 10 INJECTION, EMULSION INTRAVENOUS at 14:44

## 2022-08-04 RX ADMIN — LIDOCAINE HYDROCHLORIDE 50 MG: 20 INJECTION, SOLUTION EPIDURAL; INFILTRATION; INTRACAUDAL; PERINEURAL at 14:44

## 2022-08-04 RX ADMIN — PROPOFOL 20 MG: 10 INJECTION, EMULSION INTRAVENOUS at 14:50

## 2022-08-04 RX ADMIN — PROPOFOL 20 MG: 10 INJECTION, EMULSION INTRAVENOUS at 14:54

## 2022-08-04 RX ADMIN — PROPOFOL 30 MG: 10 INJECTION, EMULSION INTRAVENOUS at 15:02

## 2022-08-04 RX ADMIN — PROPOFOL 20 MG: 10 INJECTION, EMULSION INTRAVENOUS at 14:56

## 2022-08-04 RX ADMIN — PROPOFOL 30 MG: 10 INJECTION, EMULSION INTRAVENOUS at 14:58

## 2022-08-04 RX ADMIN — SODIUM CHLORIDE: 9 INJECTION, SOLUTION INTRAVENOUS at 14:04

## 2022-08-04 ASSESSMENT — PAIN SCALES - GENERAL
PAINLEVEL_OUTOF10: 0
PAINLEVEL_OUTOF10: 0

## 2022-08-04 ASSESSMENT — PAIN - FUNCTIONAL ASSESSMENT: PAIN_FUNCTIONAL_ASSESSMENT: NONE - DENIES PAIN

## 2022-08-04 ASSESSMENT — ENCOUNTER SYMPTOMS: SHORTNESS OF BREATH: 0

## 2022-08-04 NOTE — ANESTHESIA PRE PROCEDURE
Department of Anesthesiology  Preprocedure Note       Name:  Yuni Lorenz   Age:  46 y.o.  :  1970                                          MRN:  [id number]         Date:  2022      Surgeon: Brittany Torres):  Lilia Mathwe MD    Procedure: Procedure(s):  COLORECTAL CANCER SCREENING, NOT HIGH RISK    Medications prior to admission:   Prior to Admission medications    Medication Sig Start Date End Date Taking? Authorizing Provider   Empagliflozin (JARDIANCE PO) Take by mouth   Yes Historical Provider, MD   Dulaglutide (TRULICITY) 8.78 HS/6.9OB SOPN Inject 0.75 mg into the skin once a week   Yes Historical Provider, MD   benazepril (LOTENSIN) 20 MG tablet Take 20 mg by mouth daily    Historical Provider, MD   canagliflozin (INVOKANA) 300 MG TABS tablet Take 300 mg by mouth every morning (before breakfast)    Historical Provider, MD   metFORMIN (GLUCOPHAGE) 1000 MG tablet Take 1,000 mg by mouth 2 times daily (with meals)    Historical Provider, MD   levothyroxine (SYNTHROID) 75 MCG tablet Take 75 mcg by mouth Daily    Historical Provider, MD   hydrochlorothiazide (HYDRODIURIL) 25 MG tablet Take 25 mg by mouth daily    Historical Provider, MD   amLODIPine (NORVASC) 5 MG tablet Take 5 mg by mouth daily    Historical Provider, MD   pravastatin (PRAVACHOL) 40 MG tablet Take 40 mg by mouth daily    Historical Provider, MD       Current medications:    Current Facility-Administered Medications   Medication Dose Route Frequency Provider Last Rate Last Admin    sodium chloride flush 0.9 % injection 5-40 mL  5-40 mL IntraVENous 2 times per day Shorty Ashford MD        sodium chloride flush 0.9 % injection 5-40 mL  5-40 mL IntraVENous PRN Shorty Ashford MD        0.9 % sodium chloride infusion   IntraVENous PRN Shorty Ashford MD 50 mL/hr at 22 1404 New Bag at 22 1404       Allergies:     Allergies   Allergen Reactions    Bee Venom Hives and Swelling    Pine Tar Itching       Problem List:  There is no problem list on file for this patient. Past Medical History:        Diagnosis Date    Diabetes mellitus ([address]     TYPE 2    Hyperlipidemia     Hypertension     KAYLAN (obstructive sleep apnea)        Past Surgical History:        Procedure Laterality Date    CHOLECYSTECTOMY      EYE MUSCLE SURGERY Right 4/23/2019    RIGHT EYE INFERIOR OBLIQUE WEAKENING BY RECESSION performed by Ally Rodriguez MD at [address] History:    Social History     Tobacco Use    Smoking status: Never    Smokeless tobacco: Never   Substance Use Topics    Alcohol use: Yes     Comment: rarely                                Counseling given: Not Answered      Vital Signs (Current):   Vitals:    08/04/22 1359   BP: 129/89   Pulse: 88   Resp: 18   Temp: 97.3 °F (36.3 °C)   TempSrc: Temporal   SpO2: 97%   Weight: (!) 329 lb (149.2 kg)   Height: 5' 10\" (1.778 m)                                              BP Readings from Last 3 Encounters:   08/04/22 129/89   04/23/19 (!) 169/99   04/23/19 99/64       NPO Status: Time of last liquid consumption: 0930                        Time of last solid consumption: 1900                        Date of last liquid consumption: 08/04/22                        Date of last solid food consumption: 08/02/22    BMI:   Wt Readings from Last 3 Encounters:   08/04/22 (!) 329 lb (149.2 kg)   04/23/19 (!) 325 lb (147.4 kg)     Body mass index is 47.21 kg/m².     CBC: No results found for: WBC, RBC, HGB, HCT, MCV, RDW, PLT    CMP: No results found for: NA, K, CL, CO2, BUN, CREATININE, GFRAA, AGRATIO, LABGLOM, GLUCOSE, GLU, PROT, CALCIUM, BILITOT, ALKPHOS, AST, ALT    POC Tests:   Recent Labs     08/04/22  1403   POCGLU 109*       Coags: No results found for: PROTIME, INR, APTT    HCG (If Applicable): No results found for: PREGTESTUR, PREGSERUM, HCG, HCGQUANT     ABGs: No results found for: PHART, PO2ART, PNY8RCQ, WHB3QZH, BEART, H1FUNQQU     Type & Screen (If Applicable):  No results found for: LABABO, LABRH    Drug/Infectious Status (If Applicable):  No results found for: HIV, HEPCAB    COVID-19 Screening (If Applicable): No results found for: COVID19        Anesthesia Evaluation  Patient summary reviewed and Nursing notes reviewed no history of anesthetic complications:   Airway: Mallampati: II  TM distance: >3 FB   Neck ROM: full  Mouth opening: > = 3 FB   Dental: normal exam         Pulmonary: breath sounds clear to auscultation  (+) sleep apnea: on CPAP,      (-) pneumonia, asthma, shortness of breath and recent URI                           Cardiovascular:  Exercise tolerance: good (>4 METS),   (+) hypertension:, hyperlipidemia    (-) valvular problems/murmurs, past MI, CAD, CABG/stent, dysrhythmias,  angina,  PEREZ and no pulmonary hypertension      Rhythm: regular  Rate: normal                    Neuro/Psych:      (-) seizures, TIA and CVA           GI/Hepatic/Renal:   (+) bowel prep, morbid obesity     (-) liver disease and no renal disease       Endo/Other:    (+) DiabetesType II DM, well controlled, , hypothyroidism::., .                 Abdominal:   (+) obese,           Vascular:     - PVD, DVT and PE. Other Findings:           Anesthesia Plan      MAC     ASA 3     (53 yo M with PMHx of morbid obesity, KAYLAN, DM, hypothyroid, HLD, HTN presenting for colonoscopy screening. Discussed risks and benefits to sedation including nausea, vomiting, allergic reaction, headache, delayed cognitive recovery, stroke, heart attack, respiratory depression, and death which patient understood and agreed to proceed. The patient was given the opportunity to ask questions and all questions were answered to the patient's satisfaction.  )  Induction: intravenous. Anesthetic plan and risks discussed with patient. Plan discussed with CRNA.                   This pre-anesthesia assessment may be used as a history and physical.    DOS STAFF ADDENDUM:    Pt seen and examined, chart reviewed (including anesthesia, drug and allergy history). No interval changes to history and physical examination. Anesthetic plan, risks, benefits, alternatives, and personnel involved discussed with patient. Patient verbalized an understanding and agrees to proceed.       Karely Swanson MD  August 4, 2022  2:15 PM

## 2022-08-04 NOTE — PROCEDURES
Colonoscopy Procedure  Note          Patient: Roxi Mcbride  : 1970      Procedure: Colonoscopy with cold snare polypectomy    Date:  2022    Primary Care Physician: Kimmy Olsen     Operative surgeon: Aleks Jimenez MD  Previous Colonoscopy: none  Consent: I explained and discussed the risk, benefits and alternatives for the procedure with the patient and obtained the patient's consent for the procedure. We discussed the specific risks including bleeding, perforation, post-procedure abdominal pain, and missed lesions which could lead to interval colorectal cancers. History:       Past Medical History:   Diagnosis Date    Diabetes mellitus ([address]     TYPE 2    Hyperlipidemia     Hypertension     KAYLAN (obstructive sleep apnea)       Preoperative Diagnosis: Screen for colon cancer  Post Operative Diagnosis: Colon polyps diverticulosis internal hemorrhoids  ASA: 3  SEDATION: MAC      Procedures Performed: Colonoscopy   Scope Type: adult    Procedure Details:      With the patient in left lateral decubitus position the endoscope was inserted through the anorectal area into the rectum. The scope was then advanced through the length of the colon to the cecum and terminal ileum. The quality of preparation was good. The scope was carefully withdrawn with careful inspection. Images were taken of the multiple segments of colon, cecum, IC valve, rectum, terminal ileum. Retroflexion was preformed in the rectum.        Cecum Intubated: Yes  EBL: minimal to none  Complications:  no complications were noted  Post-operative Findings: Perianal exam unremarkable, digital rectal exam and retroflexion rectum demonstrated moderate internal hemorrhoids    In the sigmoid colon there is 2 separate sessile polyps ranging from 3 to 6 mm in size removed with cold snare resection retrieval complete    Sigmoid diverticulosis mild severity    The ascending colon there was a 1.2 cm sessile polyp removed with cold snare resection retrieval complete. Another 2 separate 5 mm polyps removed with cold snare resection retrieval complete    Otherwise the colonic mucosa was normal.  The terminal ileum was intubated was normal    Plan: Repeat colonoscopy in 3 years for colon cancer screening purposes. Signed By: MD Kailey Marti MD,   [address]  8/4/2022      Please note that some or all of this record was generated using voice recognition software. If there are any questions about the content of this document, please contact the author as some errors in translation may have occurred.

## 2022-08-04 NOTE — H&P
[facility]   Pre-operative History and Physical    Patient: Lisa Ortiz  : 1970  Acct#:     HISTORY OF PRESENT ILLNESS:    The patient is a 46 y.o. male who presents for colon cancer screening, average risk    Indications: Colon cancer screening    Past Medical History:        Diagnosis Date    Diabetes mellitus ([facility] [address]     TYPE 2    Hyperlipidemia     Hypertension     KAYLAN (obstructive sleep apnea)       Past Surgical History:        Procedure Laterality Date    CHOLECYSTECTOMY      EYE MUSCLE SURGERY Right 2019    RIGHT EYE INFERIOR OBLIQUE WEAKENING BY RECESSION performed by Laurie Hillman MD at [address]      Medications Prior to Admission:   No current facility-administered medications on file prior to encounter.      Current Outpatient Medications on File Prior to Encounter   Medication Sig Dispense Refill    Empagliflozin (JARDIANCE PO) Take by mouth      Dulaglutide (TRULICITY) 4.16 QK/9.9KF SOPN Inject 0.75 mg into the skin once a week      benazepril (LOTENSIN) 20 MG tablet Take 20 mg by mouth daily      canagliflozin (INVOKANA) 300 MG TABS tablet Take 300 mg by mouth every morning (before breakfast)      metFORMIN (GLUCOPHAGE) 1000 MG tablet Take 1,000 mg by mouth 2 times daily (with meals)      levothyroxine (SYNTHROID) 75 MCG tablet Take 75 mcg by mouth Daily      hydrochlorothiazide (HYDRODIURIL) 25 MG tablet Take 25 mg by mouth daily      amLODIPine (NORVASC) 5 MG tablet Take 5 mg by mouth daily      pravastatin (PRAVACHOL) 40 MG tablet Take 40 mg by mouth daily          Allergies:  Bee venom and Pine tar    Social History:   Social History     Socioeconomic History    Marital status:      Spouse name: Not on file    Number of children: Not on file    Years of education: Not on file    Highest education level: Not on file   Occupational History    Not on file   Tobacco Use    Smoking status: Never    Smokeless tobacco: Never   Vaping Use    Vaping Use: Never used Substance and Sexual Activity    Alcohol use: Yes     Comment: rarely    Drug use: Not on file    Sexual activity: Not on file   Other Topics Concern    Not on file   Social History Narrative    Not on file     Social Determinants of Health     Financial Resource Strain: Not on file   Food Insecurity: Not on file   Transportation Needs: Not on file   Physical Activity: Not on file   Stress: Not on file   Social Connections: Not on file   Intimate Partner Violence: Not on file   Housing Stability: Not on file      Family History:   History reviewed. No pertinent family history. PHYSICAL EXAM:      /89   Pulse 88   Temp 97.3 °F (36.3 °C) (Temporal)   Resp 18   Ht 5' 10\" (1.778 m)   Wt (!) 329 lb (149.2 kg)   SpO2 97%   BMI 47.21 kg/m²  I        Heart:  Normal apical impulse, regular rate and rhythm, normal S1 and S2, no S3 or S4, and no murmur noted    Lungs:  No increased work of breathing, good air exchange, clear to auscultation bilaterally, no crackles or wheezing    Abdomen:  No scars, normal bowel sounds, soft, non-distended, non-tender, no masses palpated, no hepatosplenomegally      ASA Class  ASA 3 - Patient with moderate systemic disease with functional limitations    Mallampati Class: 3      ASSESSMENT AND PLAN:    1. Patient is a suitable candidate for endoscopic procedure and attendant anesthesia  2. Risks, benefits, alternatives of procedure discussed in detail with patient including risks of bleeding, infection, perforation, risks of sedation, risks of missed lesions. The patient wishes to proceed.

## 2022-08-04 NOTE — ANESTHESIA POSTPROCEDURE EVALUATION
Department of Anesthesiology  Postprocedure Note    Patient: Andrés Herrmann  MRN: [id number]  YOB: 1970  Date of evaluation: 8/4/2022      Procedure Summary     Date: 08/04/22 Room / Location: [address]    Anesthesia Start: 1440 Anesthesia Stop: 7664    Procedure: COLONOSCOPY POLYPECTOMY SNARE/COLD BIOPSY Diagnosis:       Screen for colon cancer      (Screen for colon cancer)    Surgeons: Charles Ferrera MD Responsible Provider: Tacho Elder MD    Anesthesia Type: MAC ASA Status: 3          Anesthesia Type: No value filed.     Genaro Phase I: Genaro Score: 10    Genaro Phase II: Genaro Score: 10      Anesthesia Post Evaluation    Patient location during evaluation: PACU  Patient participation: complete - patient participated  Level of consciousness: awake  Airway patency: patent  Nausea & Vomiting: no nausea and no vomiting  Cardiovascular status: blood pressure returned to baseline  Respiratory status: acceptable  Hydration status: stable  Multimodal analgesia pain management approach

## 2022-08-04 NOTE — DISCHARGE INSTRUCTIONS
Endoscopy Discharge Instructions    Call with any questions or concerns. You may be drowsy or lightheaded after receiving sedation. DO NOT operate  a vehicle (automobile, bicycle, motorcycle, machinery, or power tools), no  alcoholic beverages, and do not make any important decisions today. Plan on bed rest or quiet relaxation today. Resume normal activities in the morning. Resume normal activity tomorrow unless otherwise advised by your physician. Eat a light first meal, avoiding spicy and fatty foods, then resume normal diet unless you are told otherwise by your physician. If the intravenous medication site is painful, apply warm compresses on the site until the soreness is relieved and elevate the arm above the heart. Call your physician if no improvement  in 2-3 days. POSSIBLE SYMPTOMS TO WATCH:     1. fever (greater than 100) 5. increased abdominal bloating   2. severe pain   6. excessive bleeding   3. nausea and vomiting  7. chest pain   4. chills    8. shortness of breath       Notify if these problems occur     Expected as normal and remedies:  Sore throat: use over the counter throat lozenges or gargle with warm salt water. Redness or soreness at the IV site: apply warm compress  Gaseous discomfort: belching or passing flatus (gas). Renan Laura MD        With questions or concerns call .

## (undated) DEVICE — Device

## (undated) DEVICE — SYRINGE WITH HYPODERMIC SAFETY NEEDLE: Brand: MAGELLAN

## (undated) DEVICE — TOWEL,OR,DSP,ST,BLUE,DLX,8/PK,10PK/CS: Brand: MEDLINE

## (undated) DEVICE — PACK,EENT,TURBAN DRAPE,PK II: Brand: MEDLINE

## (undated) DEVICE — SURE SET-DOUBLE BASIN-LF: Brand: MEDLINE INDUSTRIES, INC.

## (undated) DEVICE — SYRINGE TB 1ML NDL 26GA L0.375IN PLAS SLIP TIP CONVENTIONAL

## (undated) DEVICE — GOWN,SIRUS,POLYRNF,BRTHSLV,XL,30/CS: Brand: MEDLINE

## (undated) DEVICE — GLOVE SURG SZ 75 L12IN FNGR THK94MIL BRN BISQUE LTX POLYMER

## (undated) DEVICE — GOWN,SIRUS,POLYRNF,BRTHSLV,LG,30/CS: Brand: MEDLINE

## (undated) DEVICE — 1010 S-DRAPE TOWEL DRAPE 10/BX: Brand: STERI-DRAPE™

## (undated) DEVICE — COVER,MAYO STAND,XL,STERILE: Brand: MEDLINE

## (undated) DEVICE — ROLL DENT SZ NO2 L1.5IN COT UNSTARCHED SFT PLIABLE

## (undated) DEVICE — SUTURE PLN GUT SZ 6-0 L18IN ABSRB YELLOWISH TAN L6.5MM 1735G

## (undated) DEVICE — GARMENT,MEDLINE,DVT,INT,CALF,MED, GEN2: Brand: MEDLINE

## (undated) DEVICE — Z DISCONTINUED USE 2131664 WIPE INSTR L4XW5IN SPNG MEROCEL

## (undated) DEVICE — TURNOVER KIT RM INF CTRL TECH

## (undated) DEVICE — SOLUTION H2O IRR 1000ML BTL

## (undated) DEVICE — CABLE BPLR L12FT FLYING LD DISPOSABLE

## (undated) DEVICE — SUTURE VCRL SZ 6-0 L12IN ABSRB VLT L8MM S-14.25 CIR SPAT J552G

## (undated) DEVICE — SURGICAL SET UP - SURE SET: Brand: MEDLINE INDUSTRIES, INC.

## (undated) DEVICE — Z INACTIVE PAD EYE W1625XL2625IN COT FILL ABSRB W FN MESH GZ OVL

## (undated) DEVICE — GAUZE,SPONGE,4"X4",16PLY,XRAY,STRL,LF: Brand: MEDLINE

## (undated) DEVICE — SNARE ENDOSCP POLYP 2.4 MM 240 CM 10 MM 2.8 MM CAPTIVATOR